# Patient Record
Sex: MALE | Race: WHITE | NOT HISPANIC OR LATINO | Employment: OTHER | URBAN - METROPOLITAN AREA
[De-identification: names, ages, dates, MRNs, and addresses within clinical notes are randomized per-mention and may not be internally consistent; named-entity substitution may affect disease eponyms.]

---

## 2018-02-14 ENCOUNTER — OFFICE VISIT (OUTPATIENT)
Dept: FAMILY MEDICINE CLINIC | Facility: CLINIC | Age: 53
End: 2018-02-14
Payer: COMMERCIAL

## 2018-02-14 VITALS
HEIGHT: 73 IN | OXYGEN SATURATION: 98 % | BODY MASS INDEX: 33.13 KG/M2 | TEMPERATURE: 97.9 F | HEART RATE: 84 BPM | WEIGHT: 250 LBS | RESPIRATION RATE: 18 BRPM | DIASTOLIC BLOOD PRESSURE: 90 MMHG | SYSTOLIC BLOOD PRESSURE: 140 MMHG

## 2018-02-14 DIAGNOSIS — J01.00 ACUTE NON-RECURRENT MAXILLARY SINUSITIS: ICD-10-CM

## 2018-02-14 DIAGNOSIS — Z11.59 ENCOUNTER FOR HEPATITIS C SCREENING TEST FOR LOW RISK PATIENT: ICD-10-CM

## 2018-02-14 DIAGNOSIS — R10.11 RIGHT UPPER QUADRANT ABDOMINAL PAIN: Primary | ICD-10-CM

## 2018-02-14 DIAGNOSIS — Z12.5 ENCOUNTER FOR PROSTATE CANCER SCREENING: ICD-10-CM

## 2018-02-14 DIAGNOSIS — Z13.6 SCREENING FOR CARDIOVASCULAR CONDITION: ICD-10-CM

## 2018-02-14 DIAGNOSIS — R33.9 URINE RETENTION: ICD-10-CM

## 2018-02-14 LAB
SL AMB  POCT GLUCOSE, UA: NORMAL
SL AMB LEUKOCYTE ESTERASE,UA: NORMAL
SL AMB POCT BILIRUBIN,UA: NORMAL
SL AMB POCT BLOOD,UA: NORMAL
SL AMB POCT KETONES,UA: NORMAL
SL AMB POCT NITRITE,UA: NORMAL
SL AMB POCT PH,UA: 6
SL AMB POCT SPECIFIC GRAVITY,UA: 1.03
SL AMB POCT URINE PROTEIN: NORMAL
SL AMB POCT UROBILINOGEN: 0.2

## 2018-02-14 PROCEDURE — 81002 URINALYSIS NONAUTO W/O SCOPE: CPT | Performed by: FAMILY MEDICINE

## 2018-02-14 PROCEDURE — 99214 OFFICE O/P EST MOD 30 MIN: CPT | Performed by: FAMILY MEDICINE

## 2018-02-14 RX ORDER — AZITHROMYCIN 250 MG/1
TABLET, FILM COATED ORAL
Qty: 6 TABLET | Refills: 0 | Status: SHIPPED | OUTPATIENT
Start: 2018-02-14 | End: 2018-02-18

## 2018-02-14 NOTE — PROGRESS NOTES
Assessment/Plan:    No problem-specific Assessment & Plan notes found for this encounter  Diagnoses and all orders for this visit:    Right upper quadrant abdominal pain  -     POCT urine dip    Urine retention  -     POCT urine dip    Acute non-recurrent maxillary sinusitis  -     azithromycin (ZITHROMAX) 250 mg tablet; 2 tabs Day 1, then 1 tab daily X 4 days    Encounter for hepatitis C screening test for low risk patient  -     Hepatitis C antibody; Future  -     Hepatitis C antibody    Screening for cardiovascular condition  -     Comprehensive metabolic panel; Future  -     Lipid panel; Future  -     Comprehensive metabolic panel  -     Lipid panel    Encounter for prostate cancer screening  -     PSA (Reflex To Free) (Serial); Future  -     PSA (Reflex To Free) (Serial)        Patient Instructions   Defer max for 5 days  Over-the-counter medications-Tylenol for aches and pains, Robitussin or Mucinex DM as needed for cough  Avoid decongestants as that may be adding to your urine flow problems  When you are well get routine laboratory studies as ordered, then schedule appointment back for routine physical and health maintenance  You are also due for colonoscopy related to your age  Subjective:   Chief Complaint   Patient presents with    Cold Like Symptoms     head and chest congestion, body ache x 1 week          Patient ID: Robbie Thomas is a 47 y o  male  1) patient been ill intermittently over the last 3 weeks  Mostly respiratory illnesses  There have been some sinus pressure with cough  There have been some fevers at times  No relief of the over-the-counter medications  This is associated with some decreased urine flow  2) right upper quadrant abdominal discomfort  This has been an ongoing problem for 6-7 months  This is aggravated mostly with movements  There is not a definite component of increased pain after eating          The following portions of the patient's history were reviewed and updated as appropriate: allergies, current medications, past family history, past medical history, past social history and problem list     Review of Systems   Constitutional: Positive for chills and fever  Negative for appetite change  HENT: Positive for congestion, postnasal drip, rhinorrhea, sinus pressure and sore throat  Respiratory: Positive for cough, shortness of breath and wheezing  Cardiovascular: Negative for chest pain and palpitations  Gastrointestinal: Positive for abdominal pain  Negative for diarrhea, nausea and vomiting  Genitourinary: Negative for dysuria, flank pain and urgency  Musculoskeletal: Positive for myalgias  Objective:    Vitals:    02/14/18 0834   BP: 140/90   Pulse: 84   Resp: 18   Temp: 97 9 °F (36 6 °C)   SpO2: 98%        Physical Exam   Constitutional: He appears well-developed and well-nourished  No distress  HENT:   Head: Normocephalic and atraumatic  Right Ear: Tympanic membrane and external ear normal  No drainage  Left Ear: Tympanic membrane normal  There is drainage  Nose: Mucosal edema and rhinorrhea present  Right sinus exhibits maxillary sinus tenderness  Left sinus exhibits maxillary sinus tenderness  Mouth/Throat: No oropharyngeal exudate  Eyes: Conjunctivae and EOM are normal  Right eye exhibits no discharge  Left eye exhibits no discharge  Neck: Normal range of motion  Neck supple  No thyromegaly present  Cardiovascular: Normal rate, regular rhythm and normal heart sounds  Pulmonary/Chest: Effort normal  No respiratory distress  He has wheezes  He has no rales  Abdominal: Soft  Bowel sounds are normal  He exhibits mass  There is tenderness  There is no rebound and no guarding  Lymphadenopathy:     He has no cervical adenopathy  Skin: Skin is warm and dry

## 2018-02-14 NOTE — PATIENT INSTRUCTIONS
Defer max for 5 days  Over-the-counter medications-Tylenol for aches and pains, Robitussin or Mucinex DM as needed for cough  Avoid decongestants as that may be adding to your urine flow problems  When you are well get routine laboratory studies as ordered, then schedule appointment back for routine physical and health maintenance  You are also due for colonoscopy related to your age

## 2018-04-08 LAB
ALBUMIN SERPL-MCNC: 4.3 G/DL (ref 3.5–5.5)
ALBUMIN/GLOB SERPL: 1.5 {RATIO} (ref 1.2–2.2)
ALP SERPL-CCNC: 79 IU/L (ref 39–117)
ALT SERPL-CCNC: 24 IU/L (ref 0–44)
AST SERPL-CCNC: 19 IU/L (ref 0–40)
BILIRUB SERPL-MCNC: 0.4 MG/DL (ref 0–1.2)
BUN SERPL-MCNC: 28 MG/DL (ref 6–24)
BUN/CREAT SERPL: 29 (ref 9–20)
CALCIUM SERPL-MCNC: 9.4 MG/DL (ref 8.7–10.2)
CHLORIDE SERPL-SCNC: 102 MMOL/L (ref 96–106)
CHOLEST SERPL-MCNC: 224 MG/DL (ref 100–199)
CHOLEST/HDLC SERPL: 5.3 RATIO (ref 0–5)
CO2 SERPL-SCNC: 27 MMOL/L (ref 18–29)
CREAT SERPL-MCNC: 0.96 MG/DL (ref 0.76–1.27)
GLOBULIN SER-MCNC: 2.8 G/DL (ref 1.5–4.5)
GLUCOSE SERPL-MCNC: 111 MG/DL (ref 65–99)
HCV AB S/CO SERPL IA: <0.1 S/CO RATIO (ref 0–0.9)
HDLC SERPL-MCNC: 42 MG/DL
LDLC SERPL CALC-MCNC: 146 MG/DL (ref 0–99)
POTASSIUM SERPL-SCNC: 4.5 MMOL/L (ref 3.5–5.2)
PROT SERPL-MCNC: 7.1 G/DL (ref 6–8.5)
PSA SERPL-MCNC: 0.8 NG/ML (ref 0–4)
SL AMB EGFR AFRICAN AMERICAN: 104 ML/MIN/1.73
SL AMB EGFR NON AFRICAN AMERICAN: 90 ML/MIN/1.73
SL AMB REFLEX CRITERIA: NORMAL
SL AMB VLDL CHOLESTEROL CALC: 36 MG/DL (ref 5–40)
SODIUM SERPL-SCNC: 142 MMOL/L (ref 134–144)
TRIGL SERPL-MCNC: 181 MG/DL (ref 0–149)

## 2018-04-09 NOTE — PROGRESS NOTES
Call patient with lab result-due for annual physical   Labs have several minor abnormalities which I will discuss that office visit

## 2018-04-10 ENCOUNTER — TELEPHONE (OUTPATIENT)
Dept: FAMILY MEDICINE CLINIC | Facility: CLINIC | Age: 53
End: 2018-04-10

## 2018-04-10 NOTE — TELEPHONE ENCOUNTER
MSG LEFT WILL DISCUSS AT OV SCHEDULED FOR 4/11/18 WITH PM    ----- Message from Margaret Gordon MD sent at 4/9/2018 12:01 PM EDT -----  Call patient with lab result-due for annual physical   Labs have several minor abnormalities which I will discuss that office visit

## 2018-04-11 ENCOUNTER — OFFICE VISIT (OUTPATIENT)
Dept: FAMILY MEDICINE CLINIC | Facility: CLINIC | Age: 53
End: 2018-04-11
Payer: COMMERCIAL

## 2018-04-11 VITALS
RESPIRATION RATE: 16 BRPM | WEIGHT: 251 LBS | SYSTOLIC BLOOD PRESSURE: 130 MMHG | DIASTOLIC BLOOD PRESSURE: 80 MMHG | HEART RATE: 80 BPM | OXYGEN SATURATION: 96 % | BODY MASS INDEX: 33.27 KG/M2 | HEIGHT: 73 IN

## 2018-04-11 DIAGNOSIS — Z12.11 ENCOUNTER FOR SCREENING COLONOSCOPY: ICD-10-CM

## 2018-04-11 DIAGNOSIS — E78.2 MIXED HYPERLIPIDEMIA: ICD-10-CM

## 2018-04-11 DIAGNOSIS — E66.09 CLASS 1 OBESITY DUE TO EXCESS CALORIES WITHOUT SERIOUS COMORBIDITY WITH BODY MASS INDEX (BMI) OF 33.0 TO 33.9 IN ADULT: ICD-10-CM

## 2018-04-11 DIAGNOSIS — R06.00 DYSPNEA ON EXERTION: ICD-10-CM

## 2018-04-11 DIAGNOSIS — Z23 NEED FOR DIPHTHERIA-TETANUS-PERTUSSIS (TDAP) VACCINE: ICD-10-CM

## 2018-04-11 DIAGNOSIS — Z00.00 ENCOUNTER FOR WELLNESS EXAMINATION IN ADULT: Primary | ICD-10-CM

## 2018-04-11 PROCEDURE — 99396 PREV VISIT EST AGE 40-64: CPT | Performed by: FAMILY MEDICINE

## 2018-04-11 PROCEDURE — 90471 IMMUNIZATION ADMIN: CPT | Performed by: FAMILY MEDICINE

## 2018-04-11 PROCEDURE — 1036F TOBACCO NON-USER: CPT | Performed by: FAMILY MEDICINE

## 2018-04-11 PROCEDURE — 90715 TDAP VACCINE 7 YRS/> IM: CPT | Performed by: FAMILY MEDICINE

## 2018-04-11 PROCEDURE — 93000 ELECTROCARDIOGRAM COMPLETE: CPT | Performed by: FAMILY MEDICINE

## 2018-04-11 PROCEDURE — 99214 OFFICE O/P EST MOD 30 MIN: CPT | Performed by: FAMILY MEDICINE

## 2018-04-11 NOTE — PROGRESS NOTES
HPI:  Taya Prater is a 47 y o  male here for his yearly health maintenance exam    There is no problem list on file for this patient  No past medical history on file  1  Advanced Directive: no     2  Durable Power of  for Healthcare: no     3  Social History:               Marital History:               Work Status: full time              Drug and alcohol History: no drug              Alcohol Use: 2/month     4  General Health: good              Regular Dental Visits:no              Vision problems:no              Hearing Loss:no              Immunizations up to date: no                 Lifestyle:                           Healthy Diet:no                          Tobacco Dennie Kittitian                          Regular exercise:active job                          Weight concerns:yes                          Drug abuse: none     5  Reproductive Health (females only)              Premenopausal:-              Perimenopausal:-              Postmenopausal: -                 Menstrual Problems: -              Contraceptions: -              Sexually Active:yes              Pregnancy History:-     6  Over the past 2 weeks, how often have you been bothered by the following:              Little interest or pleasure in doing things: not at all               Felling down, depressed or hopeless: not at all    No current outpatient prescriptions on file  No current facility-administered medications for this visit  No Known Allergies    There is no immunization history on file for this patient      Patient Care Team:  Jessica Saldivar MD as PCP - General (Family Medicine)      Physical Exam :/80 (BP Location: Right arm, Patient Position: Sitting, Cuff Size: Large)   Pulse 80   Resp 16   Ht 6' 1" (1 854 m)   Wt 114 kg (251 lb)   SpO2 96%   BMI 33 12 kg/m²     General Appearance:    Alert, cooperative, no distress, appears stated age   Head:    Normocephalic, without obvious abnormality, atraumatic Eyes:    PERRL, conjunctiva/corneas clear, EOM's intact, fundi     benign, both eyes        Ears:    Normal TM's and external ear canals, both ears   Nose:   Nares normal, septum midline, mucosa normal, no drainage    or sinus tenderness   Throat:   Lips, mucosa, and tongue normal; teeth and gums normal   Neck:   Supple, symmetrical, trachea midline, no adenopathy;        thyroid:  No enlargement/tenderness/nodules; no carotid    bruit or JVD   Lungs:     Clear to auscultation bilaterally, respirations unlabored   Chest wall:    No tenderness or deformity   Heart:    Regular rate and rhythm, S1 and S2 normal, no murmur, rub   or gallop   Abdomen:     Soft, non-tender, bowel sounds active all four quadrants,     no masses, no organomegaly   Extremities:   Extremities normal, atraumatic, no cyanosis or edema   Pulses:   2+ and symmetric all extremities   Skin:   Skin color, texture, turgor normal, no rashes or lesions   Lymph nodes:   Cervical and supraclavicular nodes normal   Neurologic:   CNII-XII intact  Normal strength, sensation and reflexes       throughout       Physical Exam     Reviewed Updated [facility] Prior Wellness Visits:   Last Health Maintenance visit information was reviewed, patient interviewed , no change since last HM visit n/a  Last  visit information was reviewed, patient interviewed and updates made to the record today n/a    Assessment and Plan:  1  Encounter for wellness examination in adult     2  Need for diphtheria-tetanus-pertussis (Tdap) vaccine     3  Encounter for screening colonoscopy         Health Maintenance Due   Topic Date Due    HIV SCREENING  1965    Annual Wellnes Visit (AWV)  1965    COLONOSCOPY  1965    Depression Screening PHQ-9  11/01/1976    DTaP,Tdap,and Td Vaccines (1 - Tdap) 11/01/1985    INFLUENZA VACCINE  09/01/2017      Patient Instructions   Health maintenance -you should schedule a colonoscopy    Metabolic screens are up-to-date which show mild high cholesterol  Tetanus shot today  Discussed advanced directives and forms provided  Once we get the shortness of breath issue settled, you should begin to exercise outside your work  Also proper diet and weight loss would be advised

## 2018-04-11 NOTE — PROGRESS NOTES
Assessment/Plan:    No problem-specific Assessment & Plan notes found for this encounter  Diagnoses and all orders for this visit:    Encounter for wellness examination in adult    Need for diphtheria-tetanus-pertussis (Tdap) vaccine  -     TDAP VACCINE GREATER THAN OR EQUAL TO 8YO IM    Encounter for screening colonoscopy    Dyspnea on exertion  -     POCT ECG  -     Ambulatory referral to Cardiology; Future    Mixed hyperlipidemia  -     Ambulatory referral to Cardiology; Future    Class 1 obesity due to excess calories without serious comorbidity with body mass index (BMI) of 33 0 to 33 9 in adult      Medical management - improved diet will help with cholesterol  I would wait about 6 months for repeat this  You may use supplements such as red  Yeast rice  Also the apple cider vinegar is helpful for some people  If the labs do not improve we may need to put you on a statin  Shortness of breath with exertion -EKG resting is normal   I would advise cardiology consultation for further evaluation because of year strong history and your of multiple risk factors  Borderline fasting blood sugar-would repeat that with labs in 6 months in add an A1c to that  Subjective:   Chief Complaint   Patient presents with    Follow-up     mm - review labs, med refill    left arm pain and numbness, sob    Physical Exam               Patient ID: Dionicio Sarkar is a 47 y o  male  Medical management -  1 borderline cholesterol on laboratory studies  2) fasting blood sugar also borderline high  No family history of diabetes  3) acute problem shortness of breath with exertion  Some left arm tingling, but this is not definitively related to the shortness of breath with exertion  There is no actual chest pain  No palpitations  No lightheaded dizziness  There is no jaw pain or back pain with the shortness of breath  There is a family history of early heart disease          The following portions of the patient's history were reviewed and updated as appropriate: allergies, current medications, past family history, past medical history, past social history and problem list     Review of Systems   Constitutional: Negative for activity change, appetite change, diaphoresis and fatigue  Respiratory: Positive for shortness of breath  Negative for cough, chest tightness and wheezing  Cardiovascular: Positive for palpitations  Negative for chest pain and leg swelling  Fast or slow heart rate   Gastrointestinal: Negative for abdominal pain, blood in stool, constipation, diarrhea, nausea and vomiting  Genitourinary: Negative for difficulty urinating, dysuria and frequency  Musculoskeletal: Positive for arthralgias  Neurological: Positive for dizziness and light-headedness  Negative for numbness and headaches  Psychiatric/Behavioral: Negative for agitation, confusion, dysphoric mood and sleep disturbance  The patient is not nervous/anxious  Objective:      /80 (BP Location: Right arm, Patient Position: Sitting, Cuff Size: Large)   Pulse 80   Resp 16   Ht 6' 1" (1 854 m)   Wt 114 kg (251 lb)   SpO2 96%   BMI 33 12 kg/m²          Physical Exam   Constitutional: He is oriented to person, place, and time  He appears well-developed and well-nourished  No distress  HENT:   Head: Normocephalic and atraumatic  Right Ear: Tympanic membrane, external ear and ear canal normal    Left Ear: Tympanic membrane, external ear and ear canal normal    Nose: No mucosal edema or rhinorrhea  Right sinus exhibits no maxillary sinus tenderness  Left sinus exhibits no maxillary sinus tenderness  Mouth/Throat: Uvula is midline  Mucous membranes are not pale and not dry  Normal dentition  No oropharyngeal exudate  Eyes: EOM are normal  Pupils are equal, round, and reactive to light  Right eye exhibits no discharge  Left eye exhibits no discharge  Neck: Normal range of motion  Neck supple   No thyromegaly present  Cardiovascular: Normal rate, regular rhythm, normal heart sounds and intact distal pulses  No murmur heard  Pulmonary/Chest: Effort normal and breath sounds normal  No respiratory distress  He has no wheezes  He has no rales  Musculoskeletal: Normal range of motion  He exhibits no edema or tenderness  Lymphadenopathy:     He has no cervical adenopathy  Neurological: He is alert and oriented to person, place, and time  No cranial nerve deficit  Skin: He is not diaphoretic  Psychiatric: He has a normal mood and affect   His behavior is normal

## 2018-04-11 NOTE — PATIENT INSTRUCTIONS
Health maintenance -you should schedule a colonoscopy  Metabolic screens are up-to-date which show mild high cholesterol  Tetanus shot today  Discussed advanced directives and forms provided  Once we get the shortness of breath issue settled, you should begin to exercise outside your work  Also proper diet and weight loss would be advised  Medical management - improved diet will help with cholesterol  I would wait about 6 months for repeat this  You may use supplements such as red  Yeast rice  Also the apple cider vinegar is helpful for some people  If the labs do not improve we may need to put you on a statin  Shortness of breath with exertion -EKG resting is normal   I would advise cardiology consultation for further evaluation because of year strong history and your of multiple risk factors  Borderline fasting blood sugar-would repeat that with labs in 6 months in add an A1c to that

## 2018-06-20 ENCOUNTER — TELEPHONE (OUTPATIENT)
Dept: CARDIOLOGY CLINIC | Facility: CLINIC | Age: 53
End: 2018-06-20

## 2018-06-22 ENCOUNTER — TRANSCRIBE ORDERS (OUTPATIENT)
Dept: ADMINISTRATIVE | Facility: HOSPITAL | Age: 53
End: 2018-06-22

## 2018-06-22 ENCOUNTER — OFFICE VISIT (OUTPATIENT)
Dept: CARDIOLOGY CLINIC | Facility: CLINIC | Age: 53
End: 2018-06-22
Payer: COMMERCIAL

## 2018-06-22 VITALS
BODY MASS INDEX: 33.13 KG/M2 | DIASTOLIC BLOOD PRESSURE: 80 MMHG | HEART RATE: 68 BPM | WEIGHT: 250 LBS | SYSTOLIC BLOOD PRESSURE: 120 MMHG | HEIGHT: 73 IN

## 2018-06-22 DIAGNOSIS — E78.2 MIXED HYPERLIPIDEMIA: ICD-10-CM

## 2018-06-22 DIAGNOSIS — R06.00 DYSPNEA ON EXERTION: Primary | ICD-10-CM

## 2018-06-22 PROCEDURE — 99244 OFF/OP CNSLTJ NEW/EST MOD 40: CPT | Performed by: INTERNAL MEDICINE

## 2018-06-22 NOTE — PATIENT INSTRUCTIONS
I will schedule cardiac testing  You will have a stress test and echocardiogram   I have ordered blood work in reference to a lipid profile  If you have any problem in the interim please do not hesitate to call otherwise I will see you at the follow-up visit

## 2018-06-22 NOTE — PROGRESS NOTES
Cardiology Follow Up    Krissy Thornton  1965  [id number]  [facility]  [address] [place] [address] [address]    1  Dyspnea on exertion  Ambulatory referral to Cardiology   2  Mixed hyperlipidemia  Ambulatory referral to Cardiology       Interval History:  Patient is here for a cardiology evaluation  He had blood work done in April which demonstrated a total cholesterol of 224 with a triglyceride level of 181  There was not a complete profile ordered  He apparently has some shortness of breath earlier in the year and there was concern about whether this was connected to a cardiac etiology  He has a younger brother who had a heart attack and stent at the age of 50 but had alcohol abuse issues in was a heavy smoker  His father has a history of stent placement as well and is 76  Patient is a nonsmoker and nonhypertensive  He is here for device in reference to further management  He is here today with his wife and I have the privilege of caring for her parents  Patient Active Problem List   Diagnosis    Mixed hyperlipidemia    Dyspnea on exertion    Class 1 obesity due to excess calories without serious comorbidity in adult     No past medical history on file  Social History     Social History    Marital status: Single     Spouse name: N/A    Number of children: N/A    Years of education: N/A     Occupational History    Not on file       Social History Main Topics    Smoking status: Never Smoker    Smokeless tobacco: Never Used    Alcohol use Yes    Drug use: No    Sexual activity: Not on file     Other Topics Concern    Not on file     Social History Narrative    No narrative on file      Family History   Problem Relation Age of Onset    Breast cancer Mother     Heart attack Brother     Hyperlipidemia Brother     Alcohol abuse Brother     Heart attack Paternal Uncle     Mental illness Neg Hx No past surgical history on file  No current outpatient prescriptions on file  No Known Allergies    Labs:not applicable  Imaging: No results found  Review of Systems:  Review of Systems   Respiratory: Positive for shortness of breath  All other systems reviewed and are negative  Physical Exam:  Physical Exam   Constitutional: He is oriented to person, place, and time  He appears well-developed and well-nourished  HENT:   Head: Normocephalic and atraumatic  Eyes: Conjunctivae are normal  Pupils are equal, round, and reactive to light  Neck: Normal range of motion  Neck supple  Cardiovascular: Normal rate and normal heart sounds  Pulmonary/Chest: Effort normal and breath sounds normal    Neurological: He is alert and oriented to person, place, and time  Skin: Skin is warm and dry  Psychiatric: He has a normal mood and affect  Vitals reviewed  Discussion/Summary:  Patient's EKG in April looked good  He has no chest pain  His blood pressure is excellent  I will check a fasting lipid profile  I will check a standard treadmill test   I will check an echocardiogram to assess LV wall thickness and systolic function  I have asked the patient to call if there is a problem in the interim otherwise I will see him in six months time

## 2018-07-10 ENCOUNTER — TELEPHONE (OUTPATIENT)
Dept: CARDIOLOGY CLINIC | Facility: CLINIC | Age: 53
End: 2018-07-10

## 2018-07-10 DIAGNOSIS — E78.2 MIXED HYPERLIPIDEMIA: Primary | ICD-10-CM

## 2018-07-10 DIAGNOSIS — E78.5 HYPERLIPIDEMIA, UNSPECIFIED HYPERLIPIDEMIA TYPE: Primary | ICD-10-CM

## 2018-07-10 DIAGNOSIS — E78.2 MIXED HYPERLIPIDEMIA: ICD-10-CM

## 2018-07-10 LAB
ALBUMIN SERPL-MCNC: 4.4 G/DL (ref 3.5–5.5)
ALP SERPL-CCNC: 84 IU/L (ref 39–117)
ALT SERPL-CCNC: 21 IU/L (ref 0–44)
AST SERPL-CCNC: 19 IU/L (ref 0–40)
BILIRUB DIRECT SERPL-MCNC: 0.11 MG/DL (ref 0–0.4)
BILIRUB SERPL-MCNC: 0.3 MG/DL (ref 0–1.2)
CHOLEST SERPL-MCNC: 239 MG/DL (ref 100–199)
CHOLEST/HDLC SERPL: 7 RATIO (ref 0–5)
HDLC SERPL-MCNC: 34 MG/DL
LDLC SERPL CALC-MCNC: ABNORMAL MG/DL (ref 0–99)
LDLC SERPL DIRECT ASSAY-MCNC: 101 MG/DL (ref 0–99)
PROT SERPL-MCNC: 7.3 G/DL (ref 6–8.5)
SL AMB VLDL CHOLESTEROL CALC: ABNORMAL MG/DL (ref 5–40)
TRIGL SERPL-MCNC: 568 MG/DL (ref 0–149)

## 2018-07-10 RX ORDER — ATORVASTATIN CALCIUM 20 MG/1
TABLET, FILM COATED ORAL
Qty: 30 TABLET | Refills: 6 | Status: SHIPPED | OUTPATIENT
Start: 2018-07-10 | End: 2018-07-10 | Stop reason: SDUPTHER

## 2018-07-10 RX ORDER — ATORVASTATIN CALCIUM 20 MG/1
TABLET, FILM COATED ORAL
Qty: 30 TABLET | Refills: 6 | Status: SHIPPED | OUTPATIENT
Start: 2018-07-10

## 2018-07-10 NOTE — TELEPHONE ENCOUNTER
Pt did not fast for 10-12 hours, only for 9  He wants to make sure this could not affect his lipid results

## 2018-07-10 NOTE — TELEPHONE ENCOUNTER
----- Message from Milton Sorensen MD sent at 7/10/2018  8:15 AM EDT -----  Please call the patient regarding his abnormal result  Patient with elevated triglyceride level  Would recommend starting with a statin and will prescribe atorvastatin 20 mg per day  Please check fasting lipid profile and liver profile in 6-8 weeks  He should also follow a low-fat diet  Thank you

## 2018-07-13 ENCOUNTER — HOSPITAL ENCOUNTER (OUTPATIENT)
Dept: NON INVASIVE DIAGNOSTICS | Facility: CLINIC | Age: 53
Discharge: HOME | End: 2018-07-13
Payer: COMMERCIAL

## 2018-07-13 DIAGNOSIS — E78.2 MIXED HYPERLIPIDEMIA: ICD-10-CM

## 2018-07-13 DIAGNOSIS — R06.00 DYSPNEA ON EXERTION: ICD-10-CM

## 2018-07-13 LAB
CHEST PAIN STATEMENT: NORMAL
MAX DIASTOLIC BP: 75 MMHG
MAX HEART RATE: 162 BPM
MAX PREDICTED HEART RATE: 167 BPM
MAX. SYSTOLIC BP: 162 MMHG
PROTOCOL NAME: NORMAL
TARGET HR FORMULA: NORMAL
TEST INDICATION: NORMAL
TIME IN EXERCISE PHASE: NORMAL

## 2018-07-13 PROCEDURE — 93018 CV STRESS TEST I&R ONLY: CPT | Performed by: INTERNAL MEDICINE

## 2018-07-13 PROCEDURE — 93306 TTE W/DOPPLER COMPLETE: CPT | Performed by: INTERNAL MEDICINE

## 2018-07-13 PROCEDURE — 93017 CV STRESS TEST TRACING ONLY: CPT

## 2018-07-13 PROCEDURE — 93306 TTE W/DOPPLER COMPLETE: CPT

## 2018-07-13 PROCEDURE — 93016 CV STRESS TEST SUPVJ ONLY: CPT | Performed by: INTERNAL MEDICINE

## 2019-07-16 ENCOUNTER — OFFICE VISIT (OUTPATIENT)
Dept: FAMILY MEDICINE CLINIC | Facility: CLINIC | Age: 54
End: 2019-07-16
Payer: COMMERCIAL

## 2019-07-16 VITALS
HEART RATE: 83 BPM | OXYGEN SATURATION: 96 % | TEMPERATURE: 97.8 F | SYSTOLIC BLOOD PRESSURE: 126 MMHG | DIASTOLIC BLOOD PRESSURE: 83 MMHG | WEIGHT: 241 LBS | BODY MASS INDEX: 31.94 KG/M2 | HEIGHT: 73 IN

## 2019-07-16 DIAGNOSIS — Z12.5 ENCOUNTER FOR PROSTATE CANCER SCREENING: ICD-10-CM

## 2019-07-16 DIAGNOSIS — K40.20 NON-RECURRENT BILATERAL INGUINAL HERNIA WITHOUT OBSTRUCTION OR GANGRENE: ICD-10-CM

## 2019-07-16 DIAGNOSIS — R36.1 HEMATOSPERMIA: ICD-10-CM

## 2019-07-16 DIAGNOSIS — E78.2 MIXED HYPERLIPIDEMIA: ICD-10-CM

## 2019-07-16 DIAGNOSIS — R31.9 HEMATURIA, UNSPECIFIED TYPE: Primary | ICD-10-CM

## 2019-07-16 LAB
SL AMB  POCT GLUCOSE, UA: NORMAL
SL AMB LEUKOCYTE ESTERASE,UA: NORMAL
SL AMB POCT BILIRUBIN,UA: NORMAL
SL AMB POCT BLOOD,UA: NORMAL
SL AMB POCT KETONES,UA: NORMAL
SL AMB POCT NITRITE,UA: NORMAL
SL AMB POCT PH,UA: 5
SL AMB POCT SPECIFIC GRAVITY,UA: 1.01
SL AMB POCT URINE PROTEIN: NORMAL
SL AMB POCT UROBILINOGEN: NORMAL

## 2019-07-16 PROCEDURE — 3008F BODY MASS INDEX DOCD: CPT | Performed by: FAMILY MEDICINE

## 2019-07-16 PROCEDURE — 1036F TOBACCO NON-USER: CPT | Performed by: FAMILY MEDICINE

## 2019-07-16 PROCEDURE — 81002 URINALYSIS NONAUTO W/O SCOPE: CPT | Performed by: FAMILY MEDICINE

## 2019-07-16 PROCEDURE — 99214 OFFICE O/P EST MOD 30 MIN: CPT | Performed by: FAMILY MEDICINE

## 2019-07-16 NOTE — PATIENT INSTRUCTIONS
Get labs as added-observe for any further blood in the semen or the urine  If lab tests are normal consider getting ultrasound of kidneys and bladder  Cologuard provided patient will return to 3 weeks for more complete physical and review laboratory studies

## 2019-07-16 NOTE — PROGRESS NOTES
[id number] [facility]         NAME: Cecy Tadeo is a 46 y o  male  : 1965    MRN: [id number]  DATE: 2019  TIME: 11:59 AM    Assessment and Plan   Hematuria, unspecified type [R31 9]  1  Hematuria, unspecified type  POCT urine dip   2  Hematospermia  Comprehensive metabolic panel    CBC and differential    Comprehensive metabolic panel    CBC and differential   3  Mixed hyperlipidemia  Comprehensive metabolic panel    Lipid Panel with Direct LDL reflex    CK (with reflex to MB)    Comprehensive metabolic panel    Lipid Panel with Direct LDL reflex    CK (with reflex to MB)   4  Encounter for prostate cancer screening  PSA Total (Reflex To Free)    PSA Total (Reflex To Free)       No problem-specific Assessment & Plan notes found for this encounter  Patient Instructions     There are no Patient Instructions on file for this visit  Chief Complaint     Chief Complaint   Patient presents with    Blood in Urine     2 weeks          History of Present Illness       Patient here for evaluation of blood noted in semen  Also has been noted some darker urine over the last several days  No fever or chills are reported  No abdominal trauma reported  No burning on urination or decreased urine flow reported  Chart review shows that had high cholesterol and had been advise to start atorvastatin per Cardiology Dr Denia Crawford  Patient not taking this  Patient has not had recent PSA testing  Review of Systems   Review of Systems   Constitutional: Negative for appetite change, chills, fatigue and fever  Respiratory: Negative for shortness of breath and wheezing  Cardiovascular: Negative for chest pain, palpitations and leg swelling  Gastrointestinal: Negative for abdominal pain, anal bleeding, blood in stool, diarrhea and rectal pain     Genitourinary: Negative for difficulty urinating, dysuria, flank pain, frequency, penile pain, scrotal swelling, testicular pain and urgency  Current Medications       Current Outpatient Medications:     atorvastatin (LIPITOR) 20 mg tablet, Take 1 tablet by mouth in the evening, Disp: 30 tablet, Rfl: 6    Current Allergies     Allergies as of 07/16/2019    (No Known Allergies)            The following portions of the patient's history were reviewed and updated as appropriate: allergies, current medications, past family history, past medical history, past social history, past surgical history and problem list      No past medical history on file  No past surgical history on file  Family History   Problem Relation Age of Onset    Breast cancer Mother     Heart attack Brother     Hyperlipidemia Brother     Alcohol abuse Brother     Heart attack Paternal Uncle     Mental illness Neg Hx          Medications have been verified  Objective   /83   Pulse 83   Temp 97 8 °F (36 6 °C)   Ht 6' 1" (1 854 m)   Wt 109 kg (241 lb)   SpO2 96%   BMI 31 80 kg/m²        Physical Exam     Physical Exam   Constitutional: He is oriented to person, place, and time  He appears well-developed and well-nourished  No distress  HENT:   Head: Normocephalic and atraumatic  Right Ear: Tympanic membrane, external ear and ear canal normal    Left Ear: Tympanic membrane, external ear and ear canal normal    Nose: No mucosal edema or rhinorrhea  Right sinus exhibits no maxillary sinus tenderness  Left sinus exhibits no maxillary sinus tenderness  Mouth/Throat: Uvula is midline  Mucous membranes are not pale and not dry  Normal dentition  No oropharyngeal exudate  Eyes: Pupils are equal, round, and reactive to light  EOM are normal  Right eye exhibits no discharge  Left eye exhibits no discharge  Neck: Normal range of motion  Neck supple  No thyromegaly present  Cardiovascular: Normal rate, regular rhythm, normal heart sounds and intact distal pulses  No murmur heard    Pulmonary/Chest: Effort normal and breath sounds normal  No respiratory distress  He has no wheezes  He has no rales  Abdominal: Soft  Bowel sounds are normal  He exhibits no distension and no mass  There is no tenderness  There is no guarding  A hernia is present  Hernia confirmed positive in the right inguinal area and confirmed positive in the left inguinal area  Genitourinary: Testes normal and penis normal    Musculoskeletal: Normal range of motion  He exhibits no edema or tenderness  Lymphadenopathy:     He has no cervical adenopathy  Neurological: He is alert and oriented to person, place, and time  No cranial nerve deficit  Skin: He is not diaphoretic  Psychiatric: He has a normal mood and affect   His behavior is normal

## 2021-04-08 DIAGNOSIS — Z23 ENCOUNTER FOR IMMUNIZATION: ICD-10-CM

## 2021-04-21 ENCOUNTER — IMMUNIZATIONS (OUTPATIENT)
Dept: FAMILY MEDICINE CLINIC | Facility: HOSPITAL | Age: 56
End: 2021-04-21

## 2021-04-21 DIAGNOSIS — Z23 ENCOUNTER FOR IMMUNIZATION: Primary | ICD-10-CM

## 2021-04-21 PROCEDURE — 91300 SARS-COV-2 / COVID-19 MRNA VACCINE (PFIZER-BIONTECH) 30 MCG: CPT

## 2021-04-21 PROCEDURE — 0001A SARS-COV-2 / COVID-19 MRNA VACCINE (PFIZER-BIONTECH) 30 MCG: CPT

## 2021-05-18 ENCOUNTER — IMMUNIZATIONS (OUTPATIENT)
Dept: FAMILY MEDICINE CLINIC | Facility: HOSPITAL | Age: 56
End: 2021-05-18

## 2021-05-18 DIAGNOSIS — Z23 ENCOUNTER FOR IMMUNIZATION: Primary | ICD-10-CM

## 2021-05-18 PROCEDURE — 0002A SARS-COV-2 / COVID-19 MRNA VACCINE (PFIZER-BIONTECH) 30 MCG: CPT

## 2021-05-18 PROCEDURE — 91300 SARS-COV-2 / COVID-19 MRNA VACCINE (PFIZER-BIONTECH) 30 MCG: CPT

## 2021-05-24 ENCOUNTER — VBI (OUTPATIENT)
Dept: ADMINISTRATIVE | Facility: OTHER | Age: 56
End: 2021-05-24

## 2023-06-22 NOTE — TELEPHONE ENCOUNTER
Spoke with patient regarding records for upcoming new patient appointment  Patient stated he has no prior cardiology records visits or testing except EKG performed in PCP office 4/11/18  Patient was instructed to bring any medication list and or medication bottles with for visit  oriented to person, place and time , normal sensation , short and long term memory intact